# Patient Record
Sex: FEMALE | Race: WHITE | Employment: UNEMPLOYED | URBAN - METROPOLITAN AREA
[De-identification: names, ages, dates, MRNs, and addresses within clinical notes are randomized per-mention and may not be internally consistent; named-entity substitution may affect disease eponyms.]

---

## 2024-02-15 ENCOUNTER — OFFICE VISIT (OUTPATIENT)
Dept: ORTHOPEDIC SURGERY | Age: 29
End: 2024-02-15
Payer: COMMERCIAL

## 2024-02-15 VITALS — BODY MASS INDEX: 26.95 KG/M2 | RESPIRATION RATE: 14 BRPM | HEIGHT: 63 IN | WEIGHT: 152.12 LBS

## 2024-02-15 DIAGNOSIS — M54.50 ACUTE LEFT-SIDED LOW BACK PAIN WITHOUT SCIATICA: Primary | ICD-10-CM

## 2024-02-15 PROCEDURE — 99203 OFFICE O/P NEW LOW 30 MIN: CPT | Performed by: ORTHOPAEDIC SURGERY

## 2024-02-15 RX ORDER — NALTREXONE HYDROCHLORIDE 50 MG/1
TABLET, FILM COATED ORAL
COMMUNITY
Start: 2023-12-28

## 2024-02-15 RX ORDER — BUDESONIDE AND FORMOTEROL FUMARATE DIHYDRATE 160; 4.5 UG/1; UG/1
AEROSOL RESPIRATORY (INHALATION)
COMMUNITY

## 2024-02-15 RX ORDER — ORPHENADRINE CITRATE 100 MG/1
100 TABLET, EXTENDED RELEASE ORAL 2 TIMES DAILY
COMMUNITY
Start: 2024-01-25

## 2024-02-15 RX ORDER — FLUTICASONE PROPIONATE 50 MCG
SPRAY, SUSPENSION (ML) NASAL
COMMUNITY
Start: 2023-09-19

## 2024-02-15 RX ORDER — CLONAZEPAM 0.5 MG/1
.25-.5 TABLET ORAL 2 TIMES DAILY
COMMUNITY
Start: 2023-12-15

## 2024-02-15 RX ORDER — AZITHROMYCIN 250 MG/1
TABLET, FILM COATED ORAL
COMMUNITY
Start: 2023-12-06

## 2024-02-15 RX ORDER — CIPROFLOXACIN 500 MG/1
500 TABLET, FILM COATED ORAL 2 TIMES DAILY
COMMUNITY

## 2024-02-15 RX ORDER — FLUOXETINE HYDROCHLORIDE 40 MG/1
1 CAPSULE ORAL EVERY MORNING
COMMUNITY
Start: 2023-12-15

## 2024-02-15 RX ORDER — IPRATROPIUM BROMIDE AND ALBUTEROL SULFATE 2.5; .5 MG/3ML; MG/3ML
3 SOLUTION RESPIRATORY (INHALATION) EVERY 6 HOURS PRN
COMMUNITY
Start: 2023-11-02

## 2024-02-15 RX ORDER — ALBUTEROL SULFATE 90 UG/1
2 AEROSOL, METERED RESPIRATORY (INHALATION)
COMMUNITY
Start: 2018-11-20

## 2024-02-15 RX ORDER — BUPROPION HYDROCHLORIDE 100 MG/1
TABLET, EXTENDED RELEASE ORAL
COMMUNITY
Start: 2023-12-28

## 2024-02-15 RX ORDER — METHYLPREDNISOLONE 4 MG/1
TABLET ORAL
COMMUNITY
Start: 2023-12-06

## 2024-02-15 RX ORDER — MELOXICAM 15 MG/1
15 TABLET ORAL DAILY
COMMUNITY
Start: 2023-11-11

## 2024-02-15 RX ORDER — MELOXICAM 7.5 MG/1
TABLET ORAL
COMMUNITY
Start: 2024-01-11

## 2024-02-15 RX ORDER — LAMOTRIGINE 200 MG/1
1 TABLET ORAL EVERY MORNING
COMMUNITY
Start: 2023-10-25

## 2024-02-15 NOTE — PROGRESS NOTES
Patient ID: Sharon Blanca is a 28 y.o. female    Chief Compliant:  Chief Complaint   Patient presents with    Lower Back Pain        Diagnostic imaging:    Diagnostic x-rays cervical thoracic and lumbar spine reviewed normal    MRI lumbar spine some very minimal degenerative changes at L1 to otherwise normal    Assessment and Plan:  1. Acute left-sided low back pain without sciatica        Patient complains of acute low back pain that has been ongoing since October    Patient signals area just left of midline really involving entire lumbar spine    Failure of muscle relaxants and Mobic.    PT lumbar spine    Follow up 8 weeks    HPI:  This is a 28 y.o. female who presents to the clinic today as a new patient for low back evaluation.     Patient complains of left sided low back pain which occurred after an injury in October.     She has taken, Tramadol, muscle relaxants, and Mobic with no relief.     Patient's aunt is a physical therapist and she has been doing PT with her after work for the last month and a half.    She is also using a tens unit with no relief.     Review of Systems   All other systems reviewed and are negative.      Past History:  No current outpatient medications on file.  Not on File  Social History     Socioeconomic History    Marital status: Single     Spouse name: Not on file    Number of children: Not on file    Years of education: Not on file    Highest education level: Not on file   Occupational History    Not on file   Tobacco Use    Smoking status: Not on file    Smokeless tobacco: Not on file   Substance and Sexual Activity    Alcohol use: Not on file    Drug use: Not on file    Sexual activity: Not on file   Other Topics Concern    Not on file   Social History Narrative    Not on file     Social Determinants of Health     Financial Resource Strain: Not on file   Food Insecurity: Not on file   Transportation Needs: Not on file   Physical Activity: Insufficiently Active (2/14/2024)

## 2024-02-26 RX ORDER — TRAMADOL HYDROCHLORIDE 50 MG/1
50 TABLET ORAL EVERY 4 HOURS PRN
Qty: 30 TABLET | Refills: 0 | OUTPATIENT
Start: 2024-02-26 | End: 2024-03-02

## 2024-02-26 NOTE — TELEPHONE ENCOUNTER
Pt call in to see if Dr. Carvajal would call in a script for traMADol (ULTRAM) 50 MG tablet for her pain on her back. States starts her PT today and the OTC pain relievers are not helping her.       States she knows Dr. Carvajal doesn't like to use the traMADol (ULTRAM) 50 MG tablet, so if he has something else he would be will to call in for her she would be willing to try anything        Please send to     [id number] - [address] [place] [address] - P [phone] - f [phone]                  Please call pt with any questions @ [phone]

## 2024-02-28 ENCOUNTER — TELEPHONE (OUTPATIENT)
Dept: ORTHOPEDIC SURGERY | Age: 29
End: 2024-02-28

## 2024-02-28 NOTE — TELEPHONE ENCOUNTER
----- Message from Sharon Blanca sent at 2/28/2024 11:21 AM EST -----  Regarding: Medication  Contact: [phone]  I had called the other day to see what we could regarding pain medication to manage my pain. I gave it a couple days due to knowing how busy pharmacies and doctors offices are. [facility] says they have nothing. Do we know if we are doing different pain medications or keeping tramadol.? Thank you.

## 2024-03-01 NOTE — TELEPHONE ENCOUNTER
Daniel Carvajal MD   to Me       2/28/24  5:58 PM  Unable to provide narcotics already no MR and NSAIDS  I left a vm to return call.    Patient returned call, gave patient recommendations from Dr Carvajal of no narcotics, patient states she has started her physical therapy.  Patient asked what she should take for her pain, expressed the ibuprofen advil aleve, and tylenol, heat and ice.  She voiced understanding